# Patient Record
Sex: FEMALE | Race: WHITE | Employment: UNEMPLOYED | URBAN - NONMETROPOLITAN AREA
[De-identification: names, ages, dates, MRNs, and addresses within clinical notes are randomized per-mention and may not be internally consistent; named-entity substitution may affect disease eponyms.]

---

## 2019-03-20 ENCOUNTER — OFFICE VISIT (OUTPATIENT)
Dept: OBGYN | Age: 24
End: 2019-03-20
Payer: MEDICARE

## 2019-03-20 VITALS
BODY MASS INDEX: 51.91 KG/M2 | WEIGHT: 293 LBS | DIASTOLIC BLOOD PRESSURE: 80 MMHG | HEIGHT: 63 IN | SYSTOLIC BLOOD PRESSURE: 122 MMHG | HEART RATE: 76 BPM | TEMPERATURE: 97.5 F

## 2019-03-20 DIAGNOSIS — Z13.9 SCREENING FOR CONDITION: ICD-10-CM

## 2019-03-20 DIAGNOSIS — E66.01 MORBID OBESITY (HCC): ICD-10-CM

## 2019-03-20 DIAGNOSIS — N91.1 SECONDARY AMENORRHEA: Primary | ICD-10-CM

## 2019-03-20 DIAGNOSIS — L68.0 HIRSUTISM: ICD-10-CM

## 2019-03-20 DIAGNOSIS — Z13.29 SCREENING FOR THYROID DISORDER: ICD-10-CM

## 2019-03-20 DIAGNOSIS — Z13.220 SCREENING FOR LIPOID DISORDERS: ICD-10-CM

## 2019-03-20 DIAGNOSIS — F32.89 OTHER DEPRESSION: ICD-10-CM

## 2019-03-20 PROBLEM — F32.A DEPRESSION: Status: ACTIVE | Noted: 2019-03-20

## 2019-03-20 PROCEDURE — 99202 OFFICE O/P NEW SF 15 MIN: CPT | Performed by: NURSE PRACTITIONER

## 2019-03-20 RX ORDER — LANOLIN ALCOHOL/MO/W.PET/CERES
3 CREAM (GRAM) TOPICAL NIGHTLY PRN
COMMUNITY

## 2019-03-20 RX ORDER — VENLAFAXINE HYDROCHLORIDE 75 MG/1
1 CAPSULE, EXTENDED RELEASE ORAL DAILY
Refills: 0 | COMMUNITY
Start: 2019-02-06

## 2019-03-20 RX ORDER — HYDROXYZINE PAMOATE 50 MG/1
1 CAPSULE ORAL
Refills: 0 | COMMUNITY
Start: 2019-02-06 | End: 2019-03-20 | Stop reason: SDUPTHER

## 2019-03-20 RX ORDER — HYDROXYZINE PAMOATE 50 MG/1
CAPSULE ORAL
COMMUNITY
Start: 2018-12-06

## 2019-03-20 SDOH — HEALTH STABILITY: MENTAL HEALTH: HOW OFTEN DO YOU HAVE A DRINK CONTAINING ALCOHOL?: NEVER

## 2019-03-29 ENCOUNTER — HOSPITAL ENCOUNTER (OUTPATIENT)
Dept: LAB | Age: 24
Discharge: HOME | End: 2019-03-29
Payer: MEDICARE

## 2019-03-29 DIAGNOSIS — Z13.220 SCREENING FOR LIPOID DISORDERS: ICD-10-CM

## 2019-03-29 DIAGNOSIS — Z13.9 SCREENING FOR CONDITION: ICD-10-CM

## 2019-03-29 DIAGNOSIS — L68.0 HIRSUTISM: ICD-10-CM

## 2019-03-29 DIAGNOSIS — Z13.29 SCREENING FOR THYROID DISORDER: ICD-10-CM

## 2019-03-29 DIAGNOSIS — N91.1 SECONDARY AMENORRHEA: ICD-10-CM

## 2019-03-29 LAB
ABSOLUTE EOS #: 0.2 K/UL (ref 0–0.4)
ABSOLUTE IMMATURE GRANULOCYTE: NORMAL K/UL (ref 0–0.3)
ABSOLUTE LYMPH #: 1.7 K/UL (ref 1–4.8)
ABSOLUTE MONO #: 0.5 K/UL (ref 0.1–1.2)
ALBUMIN SERPL-MCNC: 4 G/DL (ref 3.5–5.2)
ALBUMIN/GLOBULIN RATIO: 1.1 (ref 1–2.5)
ALP BLD-CCNC: 68 U/L (ref 35–104)
ALT SERPL-CCNC: 16 U/L (ref 5–33)
ANION GAP SERPL CALCULATED.3IONS-SCNC: 13 MMOL/L (ref 9–17)
AST SERPL-CCNC: 18 U/L
BASOPHILS # BLD: 1 % (ref 0–1)
BASOPHILS ABSOLUTE: 0.1 K/UL (ref 0–0.2)
BILIRUB SERPL-MCNC: 0.4 MG/DL (ref 0.3–1.2)
BUN BLDV-MCNC: 14 MG/DL (ref 6–20)
BUN/CREAT BLD: 20 (ref 9–20)
CALCIUM SERPL-MCNC: 9.2 MG/DL (ref 8.6–10.4)
CHLORIDE BLD-SCNC: 99 MMOL/L (ref 98–107)
CHOLESTEROL/HDL RATIO: 4
CHOLESTEROL: 171 MG/DL
CO2: 28 MMOL/L (ref 20–31)
CREAT SERPL-MCNC: 0.69 MG/DL (ref 0.5–0.9)
DIFFERENTIAL TYPE: NORMAL
EOSINOPHILS RELATIVE PERCENT: 3 % (ref 1–7)
FOLLICLE STIMULATING HORMONE: 4.7 U/L (ref 1.7–21.5)
GFR AFRICAN AMERICAN: >60 ML/MIN
GFR NON-AFRICAN AMERICAN: >60 ML/MIN
GFR SERPL CREATININE-BSD FRML MDRD: NORMAL ML/MIN/{1.73_M2}
GFR SERPL CREATININE-BSD FRML MDRD: NORMAL ML/MIN/{1.73_M2}
GLUCOSE BLD-MCNC: 85 MG/DL (ref 70–99)
HCG QUALITATIVE: NEGATIVE
HCT VFR BLD CALC: 41.7 % (ref 36–46)
HDLC SERPL-MCNC: 43 MG/DL
HEMOGLOBIN: 13.5 G/DL (ref 12–16)
IMMATURE GRANULOCYTES: NORMAL %
INSULIN COMMENT: NORMAL
INSULIN REFERENCE RANGE:: NORMAL
INSULIN: 26.1 MU/L
LDL CHOLESTEROL: 116 MG/DL (ref 0–130)
LH: 7.9 U/L (ref 1–95.6)
LYMPHOCYTES # BLD: 29 % (ref 16–46)
MCH RBC QN AUTO: 29 PG (ref 26–34)
MCHC RBC AUTO-ENTMCNC: 32.4 G/DL (ref 31–37)
MCV RBC AUTO: 89.4 FL (ref 80–100)
MONOCYTES # BLD: 9 % (ref 4–11)
NRBC AUTOMATED: NORMAL PER 100 WBC
PDW BLD-RTO: 13.9 % (ref 11–14.5)
PLATELET # BLD: 280 K/UL (ref 140–450)
PLATELET ESTIMATE: NORMAL
PMV BLD AUTO: 7.9 FL (ref 6–12)
POTASSIUM SERPL-SCNC: 4.2 MMOL/L (ref 3.7–5.3)
PROLACTIN: 7.56 UG/L (ref 4.79–23.3)
RBC # BLD: 4.67 M/UL (ref 4–5.2)
RBC # BLD: NORMAL 10*6/UL
SEG NEUTROPHILS: 58 % (ref 43–77)
SEGMENTED NEUTROPHILS ABSOLUTE COUNT: 3.4 K/UL (ref 1.8–7.7)
SEX HORMONE BINDING GLOBULIN: 27 NMOL/L (ref 30–135)
SODIUM BLD-SCNC: 140 MMOL/L (ref 135–144)
TESTOSTERONE FREE-NONMALE: 12.4 PG/ML (ref 0.8–7.4)
TESTOSTERONE TOTAL: 61 NG/DL (ref 20–70)
THYROXINE, FREE: 1.06 NG/DL (ref 0.93–1.7)
TOTAL PROTEIN: 7.7 G/DL (ref 6.4–8.3)
TRIGL SERPL-MCNC: 61 MG/DL
TSH SERPL DL<=0.05 MIU/L-ACNC: 3.85 MIU/L (ref 0.3–5)
VLDLC SERPL CALC-MCNC: NORMAL MG/DL (ref 1–30)
WBC # BLD: 5.9 K/UL (ref 3.5–11)
WBC # BLD: NORMAL 10*3/UL

## 2019-03-29 PROCEDURE — 82670 ASSAY OF TOTAL ESTRADIOL: CPT

## 2019-03-29 PROCEDURE — 83525 ASSAY OF INSULIN: CPT

## 2019-03-29 PROCEDURE — 84144 ASSAY OF PROGESTERONE: CPT

## 2019-03-29 PROCEDURE — 84270 ASSAY OF SEX HORMONE GLOBUL: CPT

## 2019-03-29 PROCEDURE — 84439 ASSAY OF FREE THYROXINE: CPT

## 2019-03-29 PROCEDURE — 84403 ASSAY OF TOTAL TESTOSTERONE: CPT

## 2019-03-29 PROCEDURE — 80053 COMPREHEN METABOLIC PANEL: CPT

## 2019-03-29 PROCEDURE — 83001 ASSAY OF GONADOTROPIN (FSH): CPT

## 2019-03-29 PROCEDURE — 85025 COMPLETE CBC W/AUTO DIFF WBC: CPT

## 2019-03-29 PROCEDURE — 84443 ASSAY THYROID STIM HORMONE: CPT

## 2019-03-29 PROCEDURE — 83002 ASSAY OF GONADOTROPIN (LH): CPT

## 2019-03-29 PROCEDURE — 84703 CHORIONIC GONADOTROPIN ASSAY: CPT

## 2019-03-29 PROCEDURE — 36415 COLL VENOUS BLD VENIPUNCTURE: CPT

## 2019-03-29 PROCEDURE — 84146 ASSAY OF PROLACTIN: CPT

## 2019-03-29 PROCEDURE — 82627 DEHYDROEPIANDROSTERONE: CPT

## 2019-03-29 PROCEDURE — 82671 ASSAY OF ESTROGENS: CPT

## 2019-03-29 PROCEDURE — 80061 LIPID PANEL: CPT

## 2019-03-30 LAB
ESTRADIOL LEVEL: 45 PG/ML (ref 27–314)
PROGESTERONE LEVEL: <0.05 NG/ML

## 2019-04-01 LAB — DHEAS (DHEA SULFATE): 252 UG/DL (ref 65–380)

## 2019-04-03 LAB
ESTRADIOL LEVEL: 28.8 PG/ML
ESTROGEN TOTAL: 73.9 PG/ML
ESTRONE: 45.1 PG/ML

## 2019-04-05 ENCOUNTER — TELEPHONE (OUTPATIENT)
Dept: OBGYN | Age: 24
End: 2019-04-05

## 2019-04-08 NOTE — TELEPHONE ENCOUNTER
Please see result note. Patient will need to have ultrasound done in order to proceed with a treatment plan.  Please see result note on labs

## 2019-04-08 NOTE — TELEPHONE ENCOUNTER
Patient rescheduled for 4/12/19 for US and 5/1/19 for follow up with Patricio Sam to go over labs and results.

## 2019-04-12 ENCOUNTER — HOSPITAL ENCOUNTER (OUTPATIENT)
Dept: ULTRASOUND IMAGING | Age: 24
Discharge: HOME | End: 2019-04-14
Payer: MEDICARE

## 2019-04-12 DIAGNOSIS — N91.1 SECONDARY AMENORRHEA: ICD-10-CM

## 2019-04-12 PROCEDURE — 76856 US EXAM PELVIC COMPLETE: CPT

## 2019-04-12 PROCEDURE — 76830 TRANSVAGINAL US NON-OB: CPT

## 2019-04-24 ENCOUNTER — OFFICE VISIT (OUTPATIENT)
Dept: OBGYN | Age: 24
End: 2019-04-24
Payer: MEDICARE

## 2019-04-24 VITALS
HEART RATE: 88 BPM | WEIGHT: 293 LBS | DIASTOLIC BLOOD PRESSURE: 78 MMHG | TEMPERATURE: 97.8 F | BODY MASS INDEX: 51.91 KG/M2 | HEIGHT: 63 IN | SYSTOLIC BLOOD PRESSURE: 132 MMHG

## 2019-04-24 DIAGNOSIS — R93.89 THICKENED ENDOMETRIUM: ICD-10-CM

## 2019-04-24 DIAGNOSIS — N91.1 SECONDARY AMENORRHEA: Primary | ICD-10-CM

## 2019-04-24 PROCEDURE — G8427 DOCREV CUR MEDS BY ELIG CLIN: HCPCS | Performed by: NURSE PRACTITIONER

## 2019-04-24 PROCEDURE — 99213 OFFICE O/P EST LOW 20 MIN: CPT | Performed by: NURSE PRACTITIONER

## 2019-04-24 PROCEDURE — G8417 CALC BMI ABV UP PARAM F/U: HCPCS | Performed by: NURSE PRACTITIONER

## 2019-04-24 PROCEDURE — 1036F TOBACCO NON-USER: CPT | Performed by: NURSE PRACTITIONER

## 2019-04-24 RX ORDER — MEDROXYPROGESTERONE ACETATE 10 MG/1
10 TABLET ORAL DAILY
Qty: 15 TABLET | Refills: 0 | Status: SHIPPED | OUTPATIENT
Start: 2019-04-24 | End: 2019-06-12 | Stop reason: SDUPTHER

## 2019-04-24 NOTE — PROGRESS NOTES
Subjective:  Meredith Peterson presents for reports. Bloodwork and pelvic ultrasound reviewed. Ovaries not visualized on ultrasound, but endometrium was thickened. Patient given instructions on Provera 10 mg for 15 days, then call after withdrawal bleeding has stopped to schedule  recheck pelvic ultrasound and endometrial stripe. Patient verbalizedf understanding. Patient Active Problem List   Diagnosis    Morbid obesity ([facility] [address]    Depression    Hirsutism    Secondary amenorrhea     Problem list reviewed as part of this encounter. Medication list reviewed and updated. See nursing note. History of present illness reviewed in detail and expanded by me. REVIEW OF SYSTEMS:     A minimum of an eleven point review of systems was completed. Review Of Systems (11 point):  General ROS:  positive for morbid obesity  Hematological and Lymphatic ROS:negative   Breast ROS: negative  Cardiovascular ROS: negative  Respiratory ROS: negative   Gastrointestinal ROS: negative  Genito-Urinary ROS: positive for menstrual problem  Psychological ROS: negative  Neurological ROS: negative  Musculoskeletal ROS: negative  Dermatological ROS: negative                                                                                                                                          Objective:  Vitals:    04/24/19 1304   BP: 132/78   Pulse: 88   Temp: 97.8 °F (36.6 °C)     Alert and oriented. No examination was performed. 100% of the encounter was spent in counseling, education, and coordination of care. 15 minutes spent face to face. Assessment:  Encounter Diagnoses   Name Primary?  Secondary amenorrhea Yes    Thickened endometrium        Plan:  See orders. Orders Placed This Encounter   Procedures    US Pelvis Complete     Standing Status:   Future     Standing Expiration Date:   7/23/2019     Scheduling Instructions:       You will need a full bladder for this test.     Order Specific Question:   Reason for exam:     Answer:   follow up secondary amenorrhea and thickened endometrial stripe following withdrawl bleeding     New Prescriptions    MEDROXYPROGESTERONE (PROVERA) 10 MG TABLET    Take 1 tablet by mouth daily     There are no Patient Instructions on file for this visit.     (Please note that portions of this note were completed with a voice-recognition program. Efforts were made to edit the dictations but occasionally words are mis-transcribed.)

## 2019-06-12 ENCOUNTER — OFFICE VISIT (OUTPATIENT)
Dept: OBGYN | Age: 24
End: 2019-06-12
Payer: MEDICARE

## 2019-06-12 VITALS
HEIGHT: 63 IN | WEIGHT: 293 LBS | SYSTOLIC BLOOD PRESSURE: 128 MMHG | TEMPERATURE: 98.1 F | HEART RATE: 103 BPM | DIASTOLIC BLOOD PRESSURE: 80 MMHG | BODY MASS INDEX: 51.91 KG/M2

## 2019-06-12 DIAGNOSIS — E66.01 MORBID OBESITY WITH BMI OF 70 AND OVER, ADULT (HCC): ICD-10-CM

## 2019-06-12 DIAGNOSIS — R93.89 THICKENED ENDOMETRIUM: Primary | ICD-10-CM

## 2019-06-12 DIAGNOSIS — N91.1 SECONDARY AMENORRHEA: ICD-10-CM

## 2019-06-12 PROCEDURE — G8417 CALC BMI ABV UP PARAM F/U: HCPCS | Performed by: NURSE PRACTITIONER

## 2019-06-12 PROCEDURE — G8428 CUR MEDS NOT DOCUMENT: HCPCS | Performed by: NURSE PRACTITIONER

## 2019-06-12 PROCEDURE — 99214 OFFICE O/P EST MOD 30 MIN: CPT | Performed by: NURSE PRACTITIONER

## 2019-06-12 PROCEDURE — 1036F TOBACCO NON-USER: CPT | Performed by: NURSE PRACTITIONER

## 2019-06-12 RX ORDER — MEDROXYPROGESTERONE ACETATE 10 MG/1
TABLET ORAL
Qty: 10 TABLET | Refills: 5 | Status: SHIPPED | OUTPATIENT
Start: 2019-06-12

## 2019-06-12 NOTE — PROGRESS NOTES
Subjective:  Becky Chelsea presents for follow up of secondary amenorrhea, abnormal hormone studies and thickened endometrium. Had pelvic ultrasound in April that showed thickened endometrium and in May took Provera for 2 weeks, then had 2 weeks of withdrawal bleeding May 10-24. Was to have called to schedule pelvic ultrasound, but did not do so. Will schedule. Aware of the need to continue with Provera at least every other month to stimulate withdrawal bleeding. Also aware of the need for endocrinology consult. Would like consult for weight loss surgery. Patient's mother was present during encounter and appears quite supportive. Patient Active Problem List   Diagnosis    Morbid obesity ([address]    Depression    Hirsutism    Secondary amenorrhea     Problem list reviewed as part of this encounter. Medication list reviewed and updated. See nursing note. History of present illness reviewed in detail and expanded by me. REVIEW OF SYSTEMS:     A minimum of an eleven point review of systems was completed. Review Of Systems (11 point):  General ROS:  positive for morbid obesity  Hematological and Lymphatic ROS:negative   Breast ROS: negative  Cardiovascular ROS: negative  Respiratory ROS: negative   Gastrointestinal ROS: negative  Genito-Urinary ROS: positive for secondary amenorrhea  Psychological ROS: negative  Neurological ROS: negative  Musculoskeletal ROS: negative  Dermatological ROS: negative                                                                                                                                          Objective:  Vitals:    06/12/19 1507   BP: 128/80   Pulse: 103   Temp: 98.1 °F (36.7 °C)     Alert and oriented. No examination was performed. 100% of the encounter was spent in counseling, education, and coordination of care. 25 minutes spent face to face. Assessment:  Encounter Diagnoses   Name Primary?     Thickened endometrium Yes    Secondary amenorrhea     Morbid obesity with BMI of 70 and over, adult [facility])        Plan:  See orders. Orders Placed This Encounter   Procedures    US Pelvis Complete     Standing Status:   Future     Standing Expiration Date:   9/10/2019     Scheduling Instructions: You will need a full bladder for this test.     Order Specific Question:   Reason for exam:     Answer:   Recheck thickened endometrial stripe after withdrawal bleeding    HCG Qualitative, Serum     Standing Status:   Future     Standing Expiration Date:   8/12/2019   [address] [facility] Abebe Mccrary  Tacos Biggs DO, Bariatric Surgery, [place]     Referral Priority:   Routine     Referral Type:   Eval and Treat     Referral Reason:   Specialty Services Required     Referred to Provider:   Ad Wyman DO     Requested Specialty:   Bariatric Surgery     Number of Visits Requested:   1     New Prescriptions    No medications on file     There are no Patient Instructions on file for this visit.     (Please note that portions of this note were completed with a voice-recognition program. Efforts were made to edit the dictations but occasionally words are mis-transcribed.)

## 2019-06-17 ENCOUNTER — HOSPITAL ENCOUNTER (OUTPATIENT)
Dept: ULTRASOUND IMAGING | Age: 24
Discharge: HOME | End: 2019-06-19
Payer: MEDICARE

## 2019-06-17 DIAGNOSIS — N91.1 SECONDARY AMENORRHEA: ICD-10-CM

## 2019-06-17 DIAGNOSIS — R93.89 THICKENED ENDOMETRIUM: ICD-10-CM

## 2019-06-17 PROCEDURE — 76856 US EXAM PELVIC COMPLETE: CPT

## 2019-06-24 ENCOUNTER — TELEPHONE (OUTPATIENT)
Dept: OBGYN | Age: 24
End: 2019-06-24

## 2019-06-24 NOTE — TELEPHONE ENCOUNTER
Took the last pill prescribed to start her period on Sunday (yesterday) and hasn't started her period.  They were to call in if didn't start    [phone] [facility] mom

## 2019-07-25 ENCOUNTER — TELEPHONE (OUTPATIENT)
Dept: OBGYN | Age: 24
End: 2019-07-25

## 2019-07-25 NOTE — TELEPHONE ENCOUNTER
Prescribed medication to start cycle and has not started yet. Does she need another round of med? She did start last month but hasn't started again for this month.    [facility]  [phone], mom, there is a hippa form for her

## 2019-09-04 ENCOUNTER — TELEPHONE (OUTPATIENT)
Dept: OBGYN | Age: 24
End: 2019-09-04

## 2019-10-07 ENCOUNTER — TELEPHONE (OUTPATIENT)
Dept: OBGYN | Age: 24
End: 2019-10-07

## 2020-02-14 ENCOUNTER — TELEPHONE (OUTPATIENT)
Dept: BARIATRICS/WEIGHT MGMT | Age: 25
End: 2020-02-14

## 2020-06-04 ENCOUNTER — OFFICE VISIT (OUTPATIENT)
Dept: BARIATRICS/WEIGHT MGMT | Age: 25
End: 2020-06-04
Payer: MEDICARE

## 2020-06-04 VITALS
HEART RATE: 86 BPM | WEIGHT: 293 LBS | SYSTOLIC BLOOD PRESSURE: 128 MMHG | BODY MASS INDEX: 51.91 KG/M2 | HEIGHT: 63 IN | DIASTOLIC BLOOD PRESSURE: 72 MMHG

## 2020-06-04 PROCEDURE — G8427 DOCREV CUR MEDS BY ELIG CLIN: HCPCS | Performed by: SURGERY

## 2020-06-04 PROCEDURE — 99204 OFFICE O/P NEW MOD 45 MIN: CPT | Performed by: SURGERY

## 2020-06-04 PROCEDURE — 99213 OFFICE O/P EST LOW 20 MIN: CPT

## 2020-06-04 PROCEDURE — 1036F TOBACCO NON-USER: CPT | Performed by: SURGERY

## 2020-06-04 PROCEDURE — G8417 CALC BMI ABV UP PARAM F/U: HCPCS | Performed by: SURGERY

## 2020-06-04 RX ORDER — ESCITALOPRAM OXALATE 10 MG/1
TABLET ORAL
COMMUNITY
Start: 2020-04-22

## 2020-06-04 NOTE — LETTER
Visit Date: 6/4/2020    Patient: Addie Simons  YOB: 1995    Dear Dr. Abby Sands,      I had the pleasure of seeing [facility] in the office today for a consult for weight loss surgery. Her current Weight: (!) 515 lb (233.6 kg), which gives her a Body mass index is 91.23 kg/m². .  We had a long discussion regarding surgical options for weight loss and improvement in co-morbidities. She is considering a bariatric  procedure. We will start the preoperative workup, which includes bloodwork, psychological evaluation, support group attendance, and preoperative medical clearance from you. We will also initiate pre-certification for surgery, which requires a letter of medical necessity from you and often office notes documenting a weight history and co-morbidities. [facility] will require 3 months of medically supervised visits as specified by the patient's insurance. Thank you for allowing me to participate in the care of your patient. If you have any questions or concerns, please do not hesitate to call.       Sincerely,     Irene Frye DO  Director of Bariatric and Minimally Invasive Surgery  LUBNA ROMERO [facility], [address] [facility], [facility] Ano: [phone]  F: [phone]

## 2020-06-04 NOTE — PROGRESS NOTES
[address]  [address] [facility]  [place] [address] [place]  Dept: [phone]  Loc: [phone]    SURGICAL WEIGHT MANAGEMENT PROGRAM  PROGRESS NOTE INITIAL EVALUATION     Patient: Sally Salcido        Service Date: 6/4/2020      HPI:     Chief Complaint   Patient presents with    Consultation     weight gain        The patient is a pleasant 25y.o. year old female  with morbid obesity, who stands Height: 5' 3\" (160 cm) tall with a weight of Weight: (!) 515 lb (233.6 kg) , resulting in a BMI of Body mass index is 91.23 kg/m². . The patient suffers from multiple co-morbidities as a result of morbid obesity, including: Obstructive Sleep Apnea treated with BiPAP/CPAP and GERD. She has suffered from obesity for many years. The patient denies  a history of myocardial infarction, deep vein thrombosis, pulmonary embolism, renal failure, hepatic failure, stroke and seizure. The patient has failed multiple attempts at non-surgical weight loss, and is now seeking surgical intervention to promote permanent and consistent weight loss. She  has chosen Sleeve Gastrectomy. She is well educated regarding it, as she has recently viewed our weight loss surgery informational seminar . Medical History:  Past Medical History:   Diagnosis Date    Anxiety     Depression        Surgical History:  Past Surgical History:   Procedure Laterality Date    CHOLECYSTECTOMY, LAPAROSCOPIC  04/21/2017    [address] [place] [address],[address]       Family History:  No family history on file.     Social History:   Social History     Tobacco Use    Smoking status: Never Smoker    Smokeless tobacco: Never Used   Substance Use Topics    Alcohol use: Never     Frequency: Never    Drug use: Never       Current Med List:  Current Outpatient Medications   Medication Sig Dispense Refill    escitalopram (LEXAPRO) 10 MG tablet take 1 tablet by mouth once daily      venlafaxine (EFFEXOR XR) 75 MG extended release capsule Take 1 capsule by mouth daily  0    hydrOXYzine (VISTARIL) 50 MG capsule TAKE 1 CAPSULE BY MOUTH THREE TIMES A DAY AND AT BEDTIME AS NEEDED FOR ANXIETY      medroxyPROGESTERone (PROVERA) 10 MG tablet One tablet by mouth daily for 10 consecutive days every 4-6 weeks (Patient not taking: Reported on 6/4/2020) 10 tablet 5    IRON PO Take 1 tablet by mouth      melatonin 3 MG TABS tablet Take 3 mg by mouth nightly as needed       No current facility-administered medications for this visit. No Known Allergies    SOCIAL:      This patient is alone for the evaluation today. [] HIV Risk Factors (i.e.) intravenous drug abuser; at risk sexual behavior; received blood products    [] TB Risk Factors (i.e.) Medically underserved, institutional care, foreign born, endemic area; exposure to active case    [] Hepatitis B&C Risk Factors (i.e.) Received blood transfusion prior to 1992; recreational drug use; high risk sexual behaviors; tattoos or body piercings; contact with blood or needle sticks in the workplace    Comprehension    Ability to grasp concepts and respond to questions:   [x] High   [] Medium   [] Low    Motivation    [x] Asks Questions; eager to learn   [] Needs education   [] Extreme anxiety    [] uncooperative   [] Denies need for education    English Speaking Ability    [x] Speaks English well   [x] Reads English well   [x] Understands spoken Shelba Antionette    [] Understands written English   [] No need for interpretive support      [] Might benefit from interpretive support   []  required for all services     REVIEW OF SYSTEMS: (Negative unless marked otherwise)       Do you or have you had any of the following?   Cardiovascular YES NO Respiratory YES NO   High Blood Pressure   [x]   [] COPD   []   []   Heart Attack   []   [] TB/Positive skin Test   []   []   Congestive Heart Failure   []   [] Obstructive Sleep Apnea   [x]   []   Coronary Artery Disease   []   [] Asthma   []   []   Circulation Problems   []   []      Activity Intolerance   []   [] Gastrointestinal YES NO   Peripheral Vascular Disease   []   [] Gastric Problems   [x]   []        Colorectal problems   []   []   Hematological YES NO Ulcer disease   []   []   Bleeding Tendencies   []   [] Liver disease   []   []   Blood Transfusion last 30d   []   [] Gallstones   []   []   Anemia   []   [] Refulx or Heartburn   [x]   []   Blood Clots   []   []      High Cholesterol   []   [] Muscoloskeletal YES NO   High Triglycerides   []   [] Joint Limitations   [x]   []      Muscle Weakness   []   []   Eyes, Ears, Nose, Throat YES NO Multiple Sclerosis   []   []   Cataracts   []   [] Arthritis   []   []   Glasses   []   []      Blurred Vision   []   [] Cancer   []   []   Hearing Aids   []   [] Type:     Ringing in Ears   []   []      Difficulty Swallowing   []   [] Encodrine YES NO      Diabetes   []   []   Neurological YES NO Thyroid   []   []   Stroke   []   []      Seizure   []   [] Psychiatric Disorder YES NO   Dizziness/Blackouts/Fainting   []   [] Depression   [x]   []   Memory Impairement   []   [] Bipolar   []   []   Parkinson's   []   [] Anxiety disorder   [x]   []           Genitourinary/Gyn YES NO Skin Intact   [x]   []   Urinary Infection   [x]   []      Stones   []   [] Sleep   YES NO   Kidney Disease   []   [] Excessive daytime sleepiness   [x]   []   Incontinent   []   [] Snoring   [x]   []   Irregular menstrual cycles   [x]   [] Unrefreshed sleep   [x]   []   Possibly Pregnant? []     [] Other:      Date of LMP:   Preferred location:            PRESENT ILLNESS:     Weight Parameters  Weight (!) 515 lb (233.6 kg)   Height 5' 3\" (1.6 m)   BMI Body mass index is 91.23 kg/m². IBW     EBW               IMMUNIZATION STATUS    There is no immunization history on file for this patient.     FALLS ASSESSMENT    [] LOW RISK FOR FALLS    [] MODERATE RISK FOR FALLS    [] Difficulty

## 2020-06-04 NOTE — PROGRESS NOTES
Medical Nutrition Therapy  Initial Nutrition Assessment for Metabolic/ Bariatric Surgery  Required insurance visit prior to surgery:  3  Shared with patient the importance of documenting exercise and staying at or below start weight during visits. Pt reports:     Theodore East is a 25 y.o. female with a date of birth of 1995. Vitals:    06/04/20 1341   BP: 128/72   Pulse: 86    BMI: Body mass index is 91.23 kg/m². Obesity Classification: Class III    Weight History: Wt Readings from Last 3 Encounters:   06/04/20 (!) 515 lb (233.6 kg)   06/12/19 (!) 477 lb (216.4 kg)   04/24/19 (!) 469 lb (212.7 kg)        How does your weight affect your daily activities?joint pain, back pain, fatigue and short of breath with activity      What would be different in your life if you felt healthier and fit? Why is that important to you now? Do you drink alcohol? . NO    Do you use tobacco in the form of cigarettes, cigars, chew or any vapor appliance? No    Weight History      Mary Anne Ortiz's highest adult weight was 470 lbs at age 21. Patient was at her highest weight for ? Patient's triggers/known causes to her highest weight are PCO, Anxiety/Depression. Theodore East lowest adult weight was  lbs at age . Patient was at her lowest weight for . The lowest weight was achieved through just what Mary Anne weighed . Physical Activity  Do you participate in a structured exercise program, step counting or regular physical activity? no      Instructions and exercise logs were provided to patient today see goal sheet and plan. Previous weight loss attempts  Patient has participated in the following weight loss programs:   lowfat diet, RD counseling, self directed calorie restriction and lowcarb      Nutrition History  Have you ever been diagnosed with an eating disorder?  No  Have you ever had problems tolerating a multivitamin or mineral supplement?no  Have you ever been diagnosed

## 2020-06-08 LAB
ALBUMIN SERPL-MCNC: 3.8 G/DL
ALP BLD-CCNC: 63 U/L
ALT SERPL-CCNC: 15 U/L
ANION GAP SERPL CALCULATED.3IONS-SCNC: 10 MMOL/L
AST SERPL-CCNC: 16 U/L
AVERAGE GLUCOSE: 111
BASOPHILS ABSOLUTE: 0.1 /ΜL
BASOPHILS RELATIVE PERCENT: 0.9 %
BILIRUB SERPL-MCNC: 0.4 MG/DL (ref 0.1–1.4)
BUN BLDV-MCNC: 14 MG/DL
CALCIUM SERPL-MCNC: 9 MG/DL
CHLORIDE BLD-SCNC: 102 MMOL/L
CHOLESTEROL, TOTAL: 191 MG/DL
CHOLESTEROL/HDL RATIO: 4.3
CO2: 27 MMOL/L
CREAT SERPL-MCNC: 0.72 MG/DL
EOSINOPHILS ABSOLUTE: 0.3 /ΜL
EOSINOPHILS RELATIVE PERCENT: 4.1 %
FOLATE: 15.7
GFR CALCULATED: NORMAL
GLUCOSE BLD-MCNC: 107 MG/DL
HBA1C MFR BLD: 5.5 %
HCT VFR BLD CALC: 37.1 % (ref 36–46)
HDLC SERPL-MCNC: 44 MG/DL (ref 35–70)
HEMOGLOBIN: 12.3 G/DL (ref 12–16)
IRON: 50
LDL CHOLESTEROL CALCULATED: 132 MG/DL (ref 0–160)
LYMPHOCYTES ABSOLUTE: 1.8 /ΜL
LYMPHOCYTES RELATIVE PERCENT: 27.3 %
MAGNESIUM: 1.8 MG/DL
MCH RBC QN AUTO: 28.7 PG
MCHC RBC AUTO-ENTMCNC: 8.1 G/DL
MCV RBC AUTO: 87 FL
MONOCYTES ABSOLUTE: 0.5 /ΜL
MONOCYTES RELATIVE PERCENT: 7.1 %
NEUTROPHILS ABSOLUTE: 4 /ΜL
NEUTROPHILS RELATIVE PERCENT: 60.6 %
PDW BLD-RTO: 14.5 %
PLATELET # BLD: 274 K/ΜL
PMV BLD AUTO: 8.1 FL
POTASSIUM SERPL-SCNC: 4 MMOL/L
PTH INTACT: 79
RBC # BLD: 4.29 10^6/ΜL
SODIUM BLD-SCNC: 139 MMOL/L
T4 FREE: 0.84
TOTAL IRON BINDING CAPACITY: 314
TOTAL PROTEIN: 7
TRIGL SERPL-MCNC: 75 MG/DL
TSH SERPL DL<=0.05 MIU/L-ACNC: 5.03 UIU/ML
VITAMIN B-12: 362
VITAMIN D 25-HYDROXY: 10
VITAMIN D2, 25 HYDROXY: NORMAL
VITAMIN D3,25 HYDROXY: NORMAL
VLDLC SERPL CALC-MCNC: 15 MG/DL
WBC # BLD: 6.5 10^3/ML

## 2020-06-26 RX ORDER — ERGOCALCIFEROL 1.25 MG/1
50000 CAPSULE ORAL WEEKLY
Qty: 8 CAPSULE | Refills: 0 | Status: SHIPPED | OUTPATIENT
Start: 2020-06-26 | End: 2020-08-17

## 2020-08-06 ENCOUNTER — OFFICE VISIT (OUTPATIENT)
Dept: BARIATRICS/WEIGHT MGMT | Age: 25
End: 2020-08-06
Payer: MEDICARE

## 2020-08-06 VITALS
HEIGHT: 63 IN | HEART RATE: 100 BPM | BODY MASS INDEX: 51.91 KG/M2 | WEIGHT: 293 LBS | DIASTOLIC BLOOD PRESSURE: 76 MMHG | SYSTOLIC BLOOD PRESSURE: 118 MMHG

## 2020-08-06 PROCEDURE — G8427 DOCREV CUR MEDS BY ELIG CLIN: HCPCS | Performed by: SURGERY

## 2020-08-06 PROCEDURE — 99213 OFFICE O/P EST LOW 20 MIN: CPT | Performed by: SURGERY

## 2020-08-06 PROCEDURE — G8417 CALC BMI ABV UP PARAM F/U: HCPCS | Performed by: SURGERY

## 2020-08-06 PROCEDURE — 99213 OFFICE O/P EST LOW 20 MIN: CPT

## 2020-08-06 PROCEDURE — 1036F TOBACCO NON-USER: CPT | Performed by: SURGERY

## 2020-08-06 RX ORDER — METOPROLOL SUCCINATE 25 MG/1
25 TABLET, EXTENDED RELEASE ORAL DAILY
COMMUNITY
Start: 2020-06-09

## 2020-08-06 NOTE — PROGRESS NOTES
reflection. 13 I will food journal daily. 0  14 I will log my exercise daily. ?               15 I will determine my  calcium and multivitamin plan. 16 I will purchase multivitamin. 17 I will start taking multivitamins following my plan. 18 I will have 1-2 servings of protein present at each meal.                 19 I will eat every 3-5 hours. 20 I will drink 64oz of fluid daily. 21 I will follow the 15-30-15 guideline. 22 I will eat protein first at all meals followed by vegetables  Fruit and lastly whole grains. 21 My first one diet neutral approach is:                 24 my second diet neutral approach is:                 25 My third diet neutral approach is:                                                                        Do you understand your goals? y    Do you have the information you need to achieve your goals? y    Do you have any questions  right now? n        []  Consistent goal achievement in the program thus far and further success with goals is expected. [x]  Unable to consistently make progress in goal achievement. At this time patient is not moving forward  in developing the skills needed for success after surgery. Plan:    Continue to follow monthly and review goals.          [x]  Nutrition visits complete    []

## 2020-08-06 NOTE — PROGRESS NOTES
[address]  [address] [place] [address], [address] [place] [id number]  Dept: [phone]  Loc: [phone]    SURGICAL WEIGHT MANAGEMENT PROGRAM  PROGRESS NOTE EVALUATION    CC: Weight Loss     Patient: Nia Jett        Service Date: 8/6/2020      HPI:     Chief Complaint   Patient presents with    Weight Loss     visit 1 of 3       The patient is a pleasant 25y.o. year old female  with morbid obesity, who stands Height: 5' 3\" (160 cm) tall with a weight of Weight: (!) 519 lb 3.2 oz (235.5 kg) , resulting in a BMI of Body mass index is 91.97 kg/m². . The patient suffers from multiple co-morbidities as a result of morbid obesity, including: Obstructive Sleep Apnea treated with BiPAP/CPAP and GERD. She has suffered from obesity for many years. She returns for supervised diet and exercise. She has been trying to add protein into her diet. She denies new complaints. She completed her labs. She also completed her psychology evaluation.        REVIEW OF SYSTEMS: (Negative unless marked otherwise)     General  Negative for: [] Weight Change   [x] Fatigue   [x] Fevers & Chills    [] Appetite change [] Other:    Positive for: [] Weight Change   [] Fatigue   [] Fevers & Chills    [] Appetite change [] Other:   Cardiac  Negative for: [x] Chest Pain   [] Difficulty Breathing   [] Leg Cramps [x] Edema of Lower Extremeties    [] Left   [] Right      Positive for: [] Chest Pain   [] Difficulty Breathing   [] Leg Cramps [] Edema of Lower Extremeties    [] Left   [] Right   Pulmonary  Negative for: [x] Shortness of Breath [] Wheeze [x] Cough  [] Calf Pain     Positive for: [] Shortness of Breath [] Wheeze [] Cough  [] Calf Pain   Gastro-Intestinal Negative for: [] Heartburn   [] Reflux   [] Dysphagia   [] Melena   [] BRBPR  [x] Vomiting   [x] Abdominal Pain   [] Diarrhea  [] Hernia    [] Constipation  [] Other:     Positive for: [] Heartburn and Clearance    Nutrition Assessment: Bariatric Nutrition Assessment and Clearance    Pulmonary Evaluation: CPAP Settings    Other  Consultations: PCP clearance    Physician Supervised Diet and Exercise required by the patients insurance company: 3 months.       Surgical Diet requirement:  2 weeks    New hypertension meds added, continue to monitor    Goals  Water 60 oz per day  Protein 80 gm/day  Eat throughout the day  Exercise 30 minutes daily  Calories 1200 per day    Dietician visit today        Electronically signed by Savage Irene DO on 8/6/2020 at 1:07 PM

## 2020-08-17 RX ORDER — ERGOCALCIFEROL 1.25 MG/1
CAPSULE ORAL
Qty: 8 CAPSULE | Refills: 0 | Status: SHIPPED | OUTPATIENT
Start: 2020-08-17

## 2020-11-05 ENCOUNTER — OFFICE VISIT (OUTPATIENT)
Dept: BARIATRICS/WEIGHT MGMT | Age: 25
End: 2020-11-05
Payer: MEDICARE

## 2020-11-05 VITALS
WEIGHT: 293 LBS | HEIGHT: 63 IN | DIASTOLIC BLOOD PRESSURE: 64 MMHG | SYSTOLIC BLOOD PRESSURE: 118 MMHG | TEMPERATURE: 97.1 F | BODY MASS INDEX: 51.91 KG/M2 | HEART RATE: 92 BPM

## 2020-11-05 PROCEDURE — G8484 FLU IMMUNIZE NO ADMIN: HCPCS | Performed by: SURGERY

## 2020-11-05 PROCEDURE — G8417 CALC BMI ABV UP PARAM F/U: HCPCS | Performed by: SURGERY

## 2020-11-05 PROCEDURE — G8427 DOCREV CUR MEDS BY ELIG CLIN: HCPCS | Performed by: SURGERY

## 2020-11-05 PROCEDURE — 1036F TOBACCO NON-USER: CPT | Performed by: SURGERY

## 2020-11-05 PROCEDURE — 99213 OFFICE O/P EST LOW 20 MIN: CPT

## 2020-11-05 PROCEDURE — 99213 OFFICE O/P EST LOW 20 MIN: CPT | Performed by: SURGERY

## 2020-11-20 NOTE — PROGRESS NOTES
[address]  200 [facility], [address] [place] [id number]  Dept: [phone]  Loc: [phone]    SURGICAL WEIGHT MANAGEMENT PROGRAM  PROGRESS NOTE EVALUATION    CC: Weight Loss     Patient: Sue Garcia        Service Date: 11/5/2020      HPI:     Chief Complaint   Patient presents with    Weight Loss     visit  1 of 3         The patient is a pleasant 22y.o. year old female  with morbid obesity, who stands Height: 5' 3\" (160 cm) tall with a weight of Weight: (!) 528 lb 8 oz (239.7 kg) , resulting in a BMI of Body mass index is 93.62 kg/m². . The patient suffers from multiple co-morbidities as a result of morbid obesity, including: Obstructive Sleep Apnea treated with BiPAP/CPAP and GERD. She has suffered from obesity for many years. She returns for supervised diet and exercise. She stopped the program due to Covid. She has been trying to add protein into her diet, although struggling with diet per her mother. No new complaints.  She would like to restart the program.      REVIEW OF SYSTEMS: (Negative unless marked otherwise)     General  Negative for: [] Weight Change   [] Fatigue   [x] Fevers & Chills    [] Appetite change [] Other:    Positive for: [x] Weight Change   [] Fatigue   [] Fevers & Chills    [] Appetite change [] Other:   Cardiac  Negative for: [x] Chest Pain   [x] Difficulty Breathing   [] Leg Cramps [x] Edema of Lower Extremeties    [] Left   [] Right      Positive for: [] Chest Pain   [] Difficulty Breathing   [] Leg Cramps [] Edema of Lower Extremeties    [] Left   [] Right   Pulmonary  Negative for: [x] Shortness of Breath [x] Wheeze [x] Cough  [] Calf Pain     Positive for: [] Shortness of Breath [] Wheeze [] Cough  [] Calf Pain   Gastro-Intestinal Negative for: [] Heartburn   [] Reflux   [] Dysphagia   [] Melena   [] BRBPR  [x] Vomiting   [x] Abdominal Pain   [x] Diarrhea  [] Hernia    [x] Constipation  [] Other:     Positive for: [] Heartburn   [] Reflux   [] Dysphagia   [] Melena   [] BRBPR  [] Vomiting   [] Abdominal Pain   [] Diarrhea  [] Hernia    [] Constipation  [] Other:    Muskuloskeletal Negative for: [] Joint pain   [] Back pain   [] Knee Pain   [x] Muscle weakness   [x] Edema [] Other:     Positive for: [x] Joint pain   [] Back pain   [] Knee Pain   [] Muscle weakness  [] Edema [] Other:    Neurologic Negative for: [x] Syncope   [] Insomnia   [] Being treated for depression  [] Other:     Positive for: [] Syncope   [] Insomnia   [x] Being treated for depression  [] Other:    Skin Negative for: [x] Wound:   [] Open   [] Draining   [] Incisional     [x] Rash   [] Hair Loss  [] Other:     Positive for: [] Wound:   [] Open   [] Draining    [] Incisional  [] Rash   [] Hair Loss  [] Other:         PRESENT ILLNESS:     Weight Parameters  Weight (!) 528 lb 8 oz (239.7 kg)   Height 5' 3\" (1.6 m)   BMI Body mass index is 93.62 kg/m². IBW     EBW               Physician Review    [x] Past medical, family, & social history reviewed and discussed with patient. PHYSICAL EXAMINATION:      /64   Pulse 92   Temp 97.1 °F (36.2 °C)   Ht 5' 3\" (1.6 m)   Wt (!) 528 lb 8 oz (239.7 kg)   BMI 93.62 kg/m²    Constitutional:  Vital signs are normal. The patient appears well-developed and well-nourished. HEENT:   Head: Normocephalic. Atraumatic  Eyes: pupils are equal and reactive. No scleral icterus is present. Neck: No mass and no thyromegaly present. Cardiovascular: Normal rate, regular rhythm, S1 normal and S2 normal.  Radial pulses present   Pulmonary/Chest: Effort normal and breath sounds normal. No retractions  Abdominal: Soft. Normal appearance. There is no organomegaly. No tenderness. There is no rigidity, no rebound, no guarding and no Sheth's sign. Musculoskeletal:        Right lower leg: Normal. No tenderness and no edema.         Left lower leg: Normal. No tenderness and no edema. Neurological: The patient is alert and oriented. Moving all 4 extremities, sensation grossly intact bilateral  Skin: Skin is warm, dry and intact. Psychiatric: The patient has a normal mood and affect. Speech is normal and behavior is normal. Judgment and thought content normal. Cognition and memory are normal.         PLAN:       Diagnosis Orders   1. Essential hypertension     2. Morbid obesity due to excess calories (HCC)            Initial Testing     Primary Procedure: Sleeve Gastrectomy     Labwork: Initial Pre-surgical Lab Tests (CMP, TSH, Fasting Lipid Profile, Mg, Zinc, Vit B1 (whole blood), Vit B12, 25-OH Vit D, Fe,  Ferritin,  Folate) and Negative serum nicotine prior to submission for pre-auth Completed and reviewed with patient    Endoscopic Studies: Upper GI Endoscopy for GERD which has been untreated. Pending    Psychological Assessment: Psychological Evaluation and Clearance Cleared    Nutrition Assessment: Bariatric Nutrition Assessment and Clearance    Pulmonary Evaluation: CPAP Settings    Other  Consultations: PCP clearance    Physician Supervised Diet and Exercise required by the patients insurance company: 3 months. Surgical Diet requirement:  2 weeks    Medical Clearance    Goals  Water 60 oz per day  Protein 80 gm/day  Eat throughout the day  Exercise 30 minutes daily  Calories 1200 per day    Dietician visit today    We discussed her scenario extensively. Weight loss is needed to make surgery safe at this point. She held off care due to Covid, however, virtual visits were available.   Strongly recommended continued follow up    Electronically signed by Julian Cox DO on 11/20/2020 at 1:05 PM

## 2020-12-03 ENCOUNTER — OFFICE VISIT (OUTPATIENT)
Dept: BARIATRICS/WEIGHT MGMT | Age: 25
End: 2020-12-03
Payer: MEDICARE

## 2020-12-03 VITALS
HEART RATE: 80 BPM | SYSTOLIC BLOOD PRESSURE: 114 MMHG | WEIGHT: 293 LBS | BODY MASS INDEX: 51.91 KG/M2 | DIASTOLIC BLOOD PRESSURE: 62 MMHG | HEIGHT: 63 IN

## 2020-12-03 PROCEDURE — G8417 CALC BMI ABV UP PARAM F/U: HCPCS | Performed by: SURGERY

## 2020-12-03 PROCEDURE — 99213 OFFICE O/P EST LOW 20 MIN: CPT

## 2020-12-03 PROCEDURE — 99213 OFFICE O/P EST LOW 20 MIN: CPT | Performed by: SURGERY

## 2020-12-03 PROCEDURE — 1036F TOBACCO NON-USER: CPT | Performed by: SURGERY

## 2020-12-03 PROCEDURE — G8484 FLU IMMUNIZE NO ADMIN: HCPCS | Performed by: SURGERY

## 2020-12-03 PROCEDURE — G8427 DOCREV CUR MEDS BY ELIG CLIN: HCPCS | Performed by: SURGERY

## 2020-12-03 NOTE — PROGRESS NOTES
Medical Nutrition Therapy   Metabolic and Bariatric Surgery         Supervised diet and exercise preparation  Visit 2 out of 3  Pt reports: did not bring education binder, nutrition goal card or exercise logs with her today; states she needs to be put on an eating scheduled as she had success with that from previous bariatric clinic; states she has been reviewing nutriton information from previous bariatric clinic as well; unsure if patient has reviewed information from [facility]. Vitals: Wt Readings from Last 3 Encounters:   12/03/20 (!) 527 lb (239 kg)   11/05/20 (!) 528 lb 8 oz (239.7 kg)   08/06/20 (!) 519 lb 3.2 oz (235.5 kg)     lost 1 lbs over 1 month. Nutrition Assessment:   PES: Knowledge deficit related to healthy behaviors that support weight management post weight loss surgery as evidenced by Body mass index is 93.35 kg/m². Nutrition Assessment of Goal Attainment:  TREATMENT GOALS:    1. Pt  Completed 0 out of 0 goals. 2.TREATMENT GOALS FOR UPCOMING WEEK: continue all previous goals and add: # 0    All goals were planned with and agreed on by the patient. Goal card will need updated                                                          Do you understand your goals? y    Do you have the information you need to achieve your goals? y    Do you have any questions  right now? n        []  Consistent goal achievement in the program thus far and further success with goals is expected. [x]  Unable to consistently make progress in goal achievement. At this time patient is not moving forward  in developing the skills needed for success after surgery. Plan:    Continue to follow monthly and review goals.          [x]  Nutrition visits complete    []

## 2020-12-13 NOTE — PROGRESS NOTES
[address]  200 [facility], [address] [address] [place] [address]  Dept: [phone]  Loc: [phone]    SURGICAL WEIGHT MANAGEMENT PROGRAM  PROGRESS NOTE EVALUATION    CC: Weight Loss     Patient: Opal Neal        Service Date: 12/3/2020      HPI:     Chief Complaint   Patient presents with    Weight Loss     visit 2 of 3       The patient is a pleasant 22y.o. year old female  with morbid obesity, who stands Height: 5' 3\" (160 cm) tall with a weight of Weight: (!) 527 lb (239 kg) , resulting in a BMI of Body mass index is 93.35 kg/m². . The patient suffers from multiple co-morbidities as a result of morbid obesity, including: Obstructive Sleep Apnea treated with BiPAP/CPAP and GERD. She has suffered from obesity for many years. She returns for supervised diet and exercise. She lost 1 lb this month. She has increased her activity per her mother at bedside. No new complaints.       Of not she dropped out of our program and [facility] in the past      REVIEW OF SYSTEMS: (Negative unless marked otherwise)       General  Negative for: [] Weight Change   [] Fatigue   [x] Fevers & Chills    [] Appetite change [] Other:    Positive for: [x] Weight Change   [] Fatigue   [] Fevers & Chills    [] Appetite change [] Other:   Cardiac  Negative for: [x] Chest Pain   [] Difficulty Breathing   [] Leg Cramps [x] Edema of Lower Extremeties    [] Left   [] Right      Positive for: [] Chest Pain   [] Difficulty Breathing   [] Leg Cramps [] Edema of Lower Extremeties    [] Left   [] Right   Pulmonary  Negative for: [x] Shortness of Breath [] Wheeze [x] Cough  [] Calf Pain     Positive for: [] Shortness of Breath [] Wheeze [] Cough  [] Calf Pain   Gastro-Intestinal Negative for: [] Heartburn   [] Reflux   [] Dysphagia   [] Melena   [] BRBPR  [x] Vomiting   [x] Abdominal Pain   [x] Diarrhea  [] Hernia    [] Constipation  [] Other: Positive for: [] Heartburn   [] Reflux   [] Dysphagia   [] Melena   [] BRBPR  [] Vomiting   [] Abdominal Pain   [] Diarrhea  [] Hernia    [] Constipation  [] Other:    Muskuloskeletal Negative for: [] Joint pain   [] Back pain   [] Knee Pain   [] Muscle weakness   [] Edema [] Other:     Positive for: [x] Joint pain   [] Back pain   [] Knee Pain   [] Muscle weakness  [x] Edema [] Other:    Neurologic Negative for: [x] Syncope   [] Insomnia   [] Being treated for depression  [] Other:     Positive for: [] Syncope   [x] Insomnia   [] Being treated for depression  [] Other:    Skin Negative for: [] Wound:   [] Open   [] Draining   [] Incisional     [x] Rash   [x] Hair Loss  [] Other:     Positive for: [] Wound:   [] Open   [] Draining    [] Incisional  [] Rash   [] Hair Loss  [] Other:        PHYSICAL EXAMINATION:      /62   Pulse 80   Ht 5' 3\" (1.6 m)   Wt (!) 527 lb (239 kg)   BMI 93.35 kg/m²    Constitutional:  Vital signs are normal. The patient appears well-developed and well-nourished. HEENT:   Head: Normocephalic. Atraumatic  Eyes: pupils are equal and reactive. No scleral icterus is present. Neck: No mass and no thyromegaly present. Cardiovascular: Normal rate, regular rhythm, S1 normal and S2 normal.  Radial pulses present   Pulmonary/Chest: Effort normal and breath sounds normal. No retractions  Abdominal: Soft. Normal appearance. There is no organomegaly. No tenderness. There is no rigidity, no rebound, no guarding and no Sheth's sign. Musculoskeletal:        Right lower leg: Normal. No tenderness and no edema. Left lower leg: Normal. No tenderness and no edema. Skin: Skin is warm, dry and intact. Psychiatric: The patient has a normal mood and affect. Speech is normal and behavior is normal. Judgment and thought content normal. Cognition and memory are normal.       PLAN:       Diagnosis Orders   1. YENI on CPAP     2.  Gastroesophageal reflux disease without esophagitis 3. Morbid obesity due to excess calories (HCC)            Initial Testing     Primary Procedure: Sleeve Gastrectomy     Labwork: Initial Pre-surgical Lab Tests (CMP, TSH, Fasting Lipid Profile, Mg, Zinc, Vit B1 (whole blood), Vit B12, 25-OH Vit D, Fe,  Ferritin,  Folate) and Negative serum nicotine prior to submission for pre-auth Completed and reviewed with patient    Endoscopic Studies: Upper GI Endoscopy for GERD which has been untreated. Pending scheduling     Psychological Assessment: Psychological Evaluation and Clearance Cleared    Nutrition Assessment: Bariatric Nutrition Assessment and Clearance    Pulmonary Evaluation: CPAP Settings  CPAP compliance discussed for weight loss    Other  Consultations: PCP clearance    Physician Supervised Diet and Exercise required by the patients insurance company: 3 months. Surgical Diet requirement:  2 weeks    Medical Clearance    Goals:  Water 60 oz per day  Protein 80 gm/day  Eat throughout the day  Exercise 30 minutes daily  Calories 1200 per day  Reviewed with dietician today    We discussed her scenario extensively on multiple visits. Weight loss is needed to make surgery safe at this point. She held off care due to Covid, however, virtual visits were available. Strongly recommended continued follow up for further weight loss today.   Mother at bedside and concerned about patient following through with the program.  She lost 1 lbs this month    I again discussed the need for weight loss to be eligible for surgery      Electronically signed by Susana Agudelo DO on 12/13/2020 at 4:14 PM

## 2021-01-07 ENCOUNTER — OFFICE VISIT (OUTPATIENT)
Dept: BARIATRICS/WEIGHT MGMT | Age: 26
End: 2021-01-07
Payer: MEDICARE

## 2021-01-07 ENCOUNTER — TELEPHONE (OUTPATIENT)
Dept: BARIATRICS/WEIGHT MGMT | Age: 26
End: 2021-01-07

## 2021-01-07 VITALS
BODY MASS INDEX: 51.91 KG/M2 | WEIGHT: 293 LBS | HEIGHT: 63 IN | DIASTOLIC BLOOD PRESSURE: 72 MMHG | HEART RATE: 102 BPM | SYSTOLIC BLOOD PRESSURE: 114 MMHG

## 2021-01-07 DIAGNOSIS — K21.9 GASTROESOPHAGEAL REFLUX DISEASE WITHOUT ESOPHAGITIS: ICD-10-CM

## 2021-01-07 DIAGNOSIS — Z99.89 OSA ON CPAP: Primary | ICD-10-CM

## 2021-01-07 DIAGNOSIS — G47.33 OSA ON CPAP: Primary | ICD-10-CM

## 2021-01-07 DIAGNOSIS — E66.01 MORBID OBESITY DUE TO EXCESS CALORIES (HCC): ICD-10-CM

## 2021-01-07 PROCEDURE — 99212 OFFICE O/P EST SF 10 MIN: CPT | Performed by: SURGERY

## 2021-01-07 PROCEDURE — 99213 OFFICE O/P EST LOW 20 MIN: CPT

## 2021-01-07 PROCEDURE — G8417 CALC BMI ABV UP PARAM F/U: HCPCS | Performed by: SURGERY

## 2021-01-07 PROCEDURE — 1036F TOBACCO NON-USER: CPT | Performed by: SURGERY

## 2021-01-07 PROCEDURE — G8484 FLU IMMUNIZE NO ADMIN: HCPCS | Performed by: SURGERY

## 2021-01-07 PROCEDURE — G8427 DOCREV CUR MEDS BY ELIG CLIN: HCPCS | Performed by: SURGERY

## 2021-01-07 NOTE — TELEPHONE ENCOUNTER
Pt wt needs to be at 460lb in order for dr. Amadou Snider to perform bariatric surgery; given patients poor progress with the program the pt was advised to join comprehensive lifestyle intervention and return once wt loss is achieved; If pt calls to schedule she will be visit 1 of 3 and will evaluate checklist at that time and need for further visits. This was discussed with Dr. Amadou Snider as well.

## 2021-01-07 NOTE — PROGRESS NOTES
Medical Nutrition Therapy   Metabolic and Bariatric Surgery         Supervised diet and exercise preparation  Visit 3 out of 3  Pt reports:      Pt currently following structured meal plan 8pro/3veg/2fr/6 starch/3fat from education binder diet for weight management. Reviewed with pt. Vitals: Wt Readings from Last 3 Encounters:   01/07/21 (!) 532 lb 4.8 oz (241.4 kg)   12/03/20 (!) 527 lb (239 kg)   11/05/20 (!) 528 lb 8 oz (239.7 kg)           Nutrition Assessment:   PES: Knowledge deficit related to healthy behaviors that support weight management post weight loss surgery as evidenced by Body mass index is 94.29 kg/m². Nutrition Assessment of Goal Attainment:  TREATMENT GOALS:    1. Pt  Completed  out of  goals. 2.TREATMENT GOALS FOR UPCOMING WEEK: continue all previous goals and add: #     All goals were planned with and agreed on by the patient. Pt unable to complete nutrition goals at this time                                                         Do you understand your goals? Do you have the information you need to achieve your goals? Do you have any questions  right now? []  Consistent goal achievement in the program thus far and further success with goals is expected. [x]  Unable to consistently make progress in goal achievement. At this time patient is not moving forward  in developing the skills needed for success after surgery. Plan:    Continue to follow monthly and review goals.          [x]  Nutrition visits complete    []

## 2021-01-17 NOTE — PROGRESS NOTES
[address]  200 [facility], [address] [place] [address]  Dept: [phone]  Loc: [phone]    SURGICAL WEIGHT MANAGEMENT PROGRAM  PROGRESS NOTE EVALUATION    CC: Weight Loss     Patient: Kellee Vaughn        Service Date: 1/7/2021      HPI:     Chief Complaint   Patient presents with    Weight Loss     visit 3 of 3       The patient is a pleasant 22y.o. year old female  with morbid obesity, who stands Height: 5' 3\" (160 cm) tall with a weight of Weight: (!) 532 lb 4.8 oz (241.4 kg) , resulting in a BMI of Body mass index is 94.29 kg/m². . The patient suffers from multiple co-morbidities as a result of morbid obesity, including: Obstructive Sleep Apnea treated with BiPAP/CPAP and GERD. She has suffered from obesity for many years. She returns for supervised diet and exercise. She has not had significant weight loss for the past 3 months. Need for weight loss to be eligible for surgery was explained to patient multiple times. She has not completed any new weight loss goals. Her weight remains unchanged. She states she did not work on diet over the holidays.         REVIEW OF SYSTEMS: (Negative unless marked otherwise)     General  Negative for: [x] Weight Change   [] Fatigue   [x] Fevers & Chills    [] Appetite change [] Other:    Positive for: [] Weight Change   [] Fatigue   [] Fevers & Chills    [] Appetite change [] Other:   Cardiac  Negative for: [x] Chest Pain   [] Difficulty Breathing   [] Leg Cramps [x] Edema of Lower Extremeties    [] Left   [] Right      Positive for: [] Chest Pain   [] Difficulty Breathing   [] Leg Cramps [] Edema of Lower Extremeties    [] Left   [] Right   Pulmonary  Negative for: [x] Shortness of Breath [] Wheeze [x] Cough  [] Calf Pain     Positive for: [] Shortness of Breath [] Wheeze [] Cough  [] Calf Pain Gastro-Intestinal Negative for: [] Heartburn   [] Reflux   [] Dysphagia   [] Melena   [] BRBPR  [x] Vomiting   [x] Abdominal Pain   [] Diarrhea  [] Hernia    [] Constipation  [] Other:     Positive for: [] Heartburn   [x] Reflux   [] Dysphagia   [] Melena   [] BRBPR  [] Vomiting   [] Abdominal Pain   [] Diarrhea  [] Hernia    [] Constipation  [] Other:    Muskuloskeletal Negative for: [] Joint pain   [] Back pain   [] Knee Pain   [x] Muscle weakness   [x] Edema [] Other:     Positive for: [] Joint pain   [] Back pain   [] Knee Pain   [] Muscle weakness  [] Edema [] Other:    Neurologic Negative for: [x] Syncope   [] Insomnia   [] Being treated for depression  [] Other:     Positive for: [] Syncope   [] Insomnia   [x] Being treated for depression  [] Other:    Skin Negative for: [] Wound:   [] Open   [] Draining   [] Incisional     [x] Rash   [x] Hair Loss  [] Other:     Positive for: [] Wound:   [] Open   [] Draining    [] Incisional  [] Rash   [] Hair Loss  [] Other:              PHYSICAL EXAMINATION:      /72   Pulse 102   Ht 5' 3\" (1.6 m)   Wt (!) 532 lb 4.8 oz (241.4 kg)   BMI 94.29 kg/m²    Constitutional:  Vital signs are normal. The patient appears well-developed and well-nourished. HEENT:   Head: Normocephalic. Atraumatic  Eyes: pupils are equal and reactive. No scleral icterus is present. Neck: No mass and no thyromegaly present. Cardiovascular: Normal rate, regular rhythm, S1 normal and S2 normal.  Radial pulses present   Pulmonary/Chest: Effort normal and breath sounds normal. No retractions  Abdominal: Soft. Normal appearance. There is no organomegaly. No tenderness. There is no rigidity, no rebound, no guarding and no Sheth's sign. Musculoskeletal:        Right lower leg: Normal. No tenderness and no edema. Left lower leg: Normal. No tenderness and no edema. Neurological: The patient is alert and oriented.  Moving all 4 extremities, sensation grossly intact bilateral Skin: Skin is warm, dry and intact. Psychiatric: The patient has a normal mood and affect. Speech is normal and behavior is normal. Judgment and thought content normal. Cognition and memory are normal.         PLAN:       Diagnosis Orders   1. YENI on CPAP     2. Gastroesophageal reflux disease without esophagitis     3. Morbid obesity due to excess calories (HCC)            Initial Testing     Primary Procedure: Sleeve Gastrectomy     Labwork: Initial Pre-surgical Lab Tests (CMP, TSH, Fasting Lipid Profile, Mg, Zinc, Vit B1 (whole blood), Vit B12, 25-OH Vit D, Fe,  Ferritin,  Folate) and Negative serum nicotine prior to submission for pre-auth Completed and reviewed with patient    Endoscopic Studies: Upper GI Endoscopy for GERD which has been untreated. Did not complete    Psychological Assessment: Psychological Evaluation and Clearance Cleared    We discussed CPAP for the period after surgery to decrease risks    Nutrition Assessment: Bariatric Nutrition Assessment and Clearance    Pulmonary Evaluation: CPAP Settings  CPAP compliance discussed for weight loss    Physician Supervised Diet and Exercise required by the patients insurance company: 3 months. Surgical Diet requirement:  2 weeks    Medical Clearance and Cardiac Clearance due to high risk for surgery    Goals:  Water 60 oz per day  Protein 80 gm/day  Eat throughout the day  Exercise 30 minutes daily  Calories 1200 per day  Reviewed with dietician today, not progressing with any goals.   This was reviewed with patient We discussed her scenario extensively on multiple visits, and this was reinforced today again. Weight loss is needed to make surgery safe at this point. She held off care due to Covid, however, virtual visits were available. Strongly recommended continued follow up for further weight loss today. Weight is not changes this month. She states she has not changed diet, has not exercised. Her BMI is 94 at this point. I do not feel she is safe candidate for surgery at the moment. Tertiary center offered, she declined.   I offered medical weight loss as well, she was not interested      Electronically signed by Torsten Finley DO on 1/17/2021 at 4:35 PM

## 2022-11-10 ENCOUNTER — HOSPITAL ENCOUNTER (OUTPATIENT)
Dept: NEUROLOGY | Age: 27
Discharge: HOME | End: 2022-11-10
Payer: MEDICARE

## 2022-11-10 PROCEDURE — 95912 NRV CNDJ TEST 11-12 STUDIES: CPT

## 2022-11-10 PROCEDURE — 95886 MUSC TEST DONE W/N TEST COMP: CPT

## 2022-11-10 NOTE — PROGRESS NOTES
EMG/NCS Bilateral    upper Completed    PCP: Stanton Juarez    Ordering: Deepti Mckeon MD    Interpreting Physician: Anil Sánchez MD    Technician: Autumn Uribe RN

## 2022-11-14 NOTE — PROCEDURES
EMG Nerve Conduction Study    Patient Name: Kade Velazco   MRN: [id number]  Date of Procedure: 11/14/2022    Procedure: BUE EMG/NCV  . EMG Summary Table     Spontaneous MUAP Recruitment    IA Fib PSW Fasc H.F. Amp Dur. PPP Pattern   R. FIRST D INTEROSS N None None None None N N N N   .         N            N   R. Abd [place] Brev N None None None None N N N N   R. Abd Dig Min N None None None None N N N N   R. BICEPS N None None None None N N N N   R. DELTOID N None None None None N N N N   R. TRICEPS N None None None None N N N N   .                                    R. Flexor Dig Longus N None None None None       . R. Extensor Dig. Radialis N None None None None       R. Extensor Dig. Ulnaris N None None None None             EMG Summary Table     Spontaneous MUAP Recruitment    IA Fib PSW Fasc H.F. Amp Dur. PPP Pattern   L. FIRST D INTEROSS N None None None None N N N N   .         N            N   L. Abd Poll Brev N None None None None       L. Abd Dig Min N None None None None       L. BICEPS N None None None None N N N N   L. DELTOID N None None None None N N N N   L. TRICEPS N None None None None N N N N   .                                    L. Flexor Dig Longus N None None None        . L. Extensor Dig. Radialis N None None None        R. Extensor Dig.  Ulnaris N None None None          EMG Summary Table      Impression:        Electronically signed by Mala Bundy MD on 11/14/2022 at 11:34 AM

## 2022-11-14 NOTE — PROCEDURES
Karen Duarte is a 32 y.o. female patient. No diagnosis found. Past Medical History:   Diagnosis Date    Anxiety     Depression      There were no vitals taken for this visit. Electromyography/ Nerve Conduction Study  (EMG/NCV)    Patient Name: Karen Duarte   MRN: [id number]  Date of Procedure: 11/14/2022 . Procedure:  Bilateral Upper Extremity EMG /NCV    Chief Complaint::L hand  numbness    Karen Duarte is a 32 y.o.  female  Referred  By Nathan Brink   for electrodiagnostic medicine consultation. Patient complains of No chief complaint on file. . Patient has pain and numbness and lack of feeling in L hand   This been going on for about 6 months without any specific injury. Patient has abnormal  sensation in the  -.  Pain does not radiate . There is not history of fractures    Patient does not have Diabetes Mellitus. Patient does not have neck pain, left arm pain nor current pain or weakness in legs. There are not new XRays  Imaging:  [unfilled]      Labs:           Pain Scale:  Pain and numbness reported as 2 out of 10. Her present pain began -. Pain has not changed since onset. Pain is associated with:-    Patient does report numbness. Patient does not report tingling. There is not associated weakness. Difficulty controlling bowels: No.  Difficulty controlling bladder: No.  Electrical shock sensation in her legs: No.  Difficulty with balance while standing or walking: No.    Patient reports that rest help to alleviate the pain. The following changes pain for the worse:  -  .     The following treatment has been tried:  Physical therapy: No.   Chiropractic treatment: No.  Epidural steroid injection/block: No.   Prescription medications: No.   Over-the-counter medications: No.      Past Medical History:  Past Medical History:   Diagnosis Date    Anxiety     Depression         Past Surgical History  Past Surgical History:   Procedure Laterality Date CHOLECYSTECTOMY, LAPAROSCOPIC  04/21/2017 [address] History :  No family history on file. Social History   Social History     Socioeconomic History    Marital status: Single   Tobacco Use    Smoking status: Never    Smokeless tobacco: Never   Substance and Sexual Activity    Alcohol use: Never    Drug use: Never    Sexual activity: Not Currently         Review of Systems  No Known Allergies  Current Outpatient Medications on File Prior to Encounter   Medication Sig Dispense Refill    vitamin D (ERGOCALCIFEROL) 1.25 MG (14070 UT) CAPS capsule take 1 capsule by mouth every week 8 capsule 0    metoprolol succinate (TOPROL XL) 25 MG extended release tablet Take 25 mg by mouth daily      escitalopram (LEXAPRO) 10 MG tablet take 1 tablet by mouth once daily      medroxyPROGESTERone (PROVERA) 10 MG tablet One tablet by mouth daily for 10 consecutive days every 4-6 weeks 10 tablet 5    IRON PO Take 1 tablet by mouth      venlafaxine (EFFEXOR XR) 75 MG extended release capsule Take 1 capsule by mouth daily  0    melatonin 3 MG TABS tablet Take 3 mg by mouth nightly as needed      hydrOXYzine (VISTARIL) 50 MG capsule TAKE 1 CAPSULE BY MOUTH THREE TIMES A DAY AND AT BEDTIME AS NEEDED FOR ANXIETY       No current facility-administered medications on file prior to encounter. Constitutional- no chills fever fatigue  HENT: no congestion   Eyes: no discharge itching  Respiratory: Negative for choking chest tightness, short of breath  Gastrointestinal: no nausea vomiting  Abdominal pain  Musculoskeletal: negative for arthralgias back pain currently  Behavioral: mild anxiety    Physical Exam:   General Appearance: Morbidly Obese alert, well appearing, and in no acute distress  Mental status: oriented to person, place, and time with normal affect  Head:  normocephalic, atraumatic.   Eye: no icterus, redness, pupils equal and reactive, extraocular eye movements intact, conjunctiva clear  Ear: normal external ear, no discharge, hearing intact  Nose:  no drainage noted  Mouth: mucous membranes moist  Neck: supple, no carotid bruits, thyroid not palpable  Lungs: Bilateral equal air entry, clear to ausculation, no wheezing, rales or rhonchi, normal effort  Cardiovascular: normal rate, regular rhythm, no murmur, gallop, rub.   Abdomen: Soft, nontender, nondistended, normal bowel sounds, no hepatomegaly or splenomegaly  Neurologic: normal muscle tone and bulk, sensory deficits slight  decreased  light  touch finger tips Lef, normal speech, cranial nerves II through XII grossly intact  Musculoskeletal:  No bony deformities  Skin: No gross lesions, rashes, bruising or bleeding on exposed skin area  Extremities:  peripheral pulses palpable, no pedal edema or calf pain with palpation  Psych: normal affect     EMG/NCV Findings:   Normal Nerve Conduction Studies and   Normal Electromyographic  studies of Bilateral Upper Extremities  No Cervical Radiculopathy B  No Brachial Plexopathy B  No mononeuropathies nor  peripheral neuropathies                                       Impression: NORMAL EMG/NCV OF BILATERAL UPPER EXTREMITIES    TABLES      Findings of Nerve Conductions: Please see NCV table      Impression: L Median Neuritis without EMG/NCV evidence of Carpal Tunnel Syndrome                 Morbid Obesity       Plan: per Dr. Mahnaz Hammond     Thank You for allowing me to participate in the care of your Patient                 Electronically signed by Enoch Curtis MD on 11/14/2022 at 9:44 AM            Enoch Curtis MD  11/14/2022